# Patient Record
Sex: MALE | Race: WHITE | Employment: OTHER | URBAN - METROPOLITAN AREA
[De-identification: names, ages, dates, MRNs, and addresses within clinical notes are randomized per-mention and may not be internally consistent; named-entity substitution may affect disease eponyms.]

---

## 2023-08-24 RX ORDER — VERAPAMIL HYDROCHLORIDE 240 MG/1
240 TABLET, FILM COATED, EXTENDED RELEASE ORAL NIGHTLY
COMMUNITY

## 2023-09-26 ENCOUNTER — HOSPITAL ENCOUNTER (OUTPATIENT)
Facility: HOSPITAL | Age: 88
Setting detail: HOSPITAL OUTPATIENT SURGERY
Discharge: HOME | End: 2023-09-26
Attending: PLASTIC SURGERY | Admitting: PLASTIC SURGERY
Payer: MEDICARE

## 2023-09-26 VITALS
OXYGEN SATURATION: 99 % | BODY MASS INDEX: 26.2 KG/M2 | HEIGHT: 66 IN | TEMPERATURE: 98 F | WEIGHT: 163 LBS | SYSTOLIC BLOOD PRESSURE: 169 MMHG | HEART RATE: 86 BPM | RESPIRATION RATE: 16 BRPM | DIASTOLIC BLOOD PRESSURE: 97 MMHG

## 2023-09-26 PROCEDURE — 0HR0XK3 REPLACEMENT OF SCALP SKIN WITH NONAUTOLOGOUS TISSUE SUBSTITUTE, FULL THICKNESS, EXTERNAL APPROACH: ICD-10-PCS | Performed by: PLASTIC SURGERY

## 2023-09-26 PROCEDURE — 0HB0XZZ EXCISION OF SCALP SKIN, EXTERNAL APPROACH: ICD-10-PCS | Performed by: PLASTIC SURGERY

## 2023-09-26 DEVICE — INTEGRA® BILAYER MATRIX WOUND DRESSING 2 IN*2 IN (5 CM*5 CM)
Type: IMPLANTABLE DEVICE | Site: SCALP | Status: FUNCTIONAL
Brand: INTEGRA®

## 2023-09-26 RX ORDER — LIDOCAINE HYDROCHLORIDE AND EPINEPHRINE 10; 10 MG/ML; UG/ML
INJECTION, SOLUTION INFILTRATION; PERINEURAL AS NEEDED
Status: DISCONTINUED | OUTPATIENT
Start: 2023-09-26 | End: 2023-09-26 | Stop reason: HOSPADM

## 2023-09-26 RX ORDER — CEPHALEXIN 500 MG/1
500 CAPSULE ORAL 3 TIMES DAILY
Qty: 21 CAPSULE | Refills: 0 | Status: SHIPPED | OUTPATIENT
Start: 2023-09-26

## 2023-09-26 RX ORDER — SODIUM CHLORIDE, SODIUM LACTATE, POTASSIUM CHLORIDE, CALCIUM CHLORIDE 600; 310; 30; 20 MG/100ML; MG/100ML; MG/100ML; MG/100ML
INJECTION, SOLUTION INTRAVENOUS CONTINUOUS
Status: DISCONTINUED | OUTPATIENT
Start: 2023-09-26 | End: 2023-09-26

## 2023-09-26 RX ORDER — CEFAZOLIN SODIUM/WATER 2 G/20 ML
2 SYRINGE (ML) INTRAVENOUS ONCE
Status: COMPLETED | OUTPATIENT
Start: 2023-09-26 | End: 2023-09-26

## 2023-09-26 RX ORDER — MINERAL OIL
OIL (ML) MISCELLANEOUS AS NEEDED
Status: DISCONTINUED | OUTPATIENT
Start: 2023-09-26 | End: 2023-09-26 | Stop reason: HOSPADM

## 2023-09-26 RX ORDER — ACETAMINOPHEN 500 MG
1000 TABLET ORAL ONCE
Status: DISCONTINUED | OUTPATIENT
Start: 2023-09-26 | End: 2023-09-26 | Stop reason: HOSPADM

## 2023-09-26 NOTE — DISCHARGE INSTRUCTIONS
POST OPERATIVE INSTRUCTIONS    You may not get the operative site wet. Please take pain medicine as needed, tylenol and Ibuprofen. Please avoid trauma to the area. Please keep the surgical site protected. Please call if you develop signs of infection or fluid collection. If your surgical site is on your head, please keep your head elevated, sleep with a couple of pillows, and avoid strenuous activity. No heavy lifting. Please keep you dressing on until follow up.

## 2023-09-26 NOTE — BRIEF OP NOTE
Pre-Operative Diagnosis: MELANOMA OF SCALP     Post-Operative Diagnosis: MELANOMA OF SCALP      Procedure Performed:   INTEGRA PLACEMENT OF SCALP    Surgeon(s) and Role:     * Bernarda Leyden, MD - Primary    Assistant(s):  Surgical Assistant.: Gustabo Alfonso CSA          Estimated Blood Loss: Blood Output: 5 mL (9/26/2023  1:23 PM)          Tena Briseno MD  9/26/2023  1:27 PM

## 2023-10-05 RX ORDER — SODIUM CHLORIDE, SODIUM LACTATE, POTASSIUM CHLORIDE, CALCIUM CHLORIDE 600; 310; 30; 20 MG/100ML; MG/100ML; MG/100ML; MG/100ML
INJECTION, SOLUTION INTRAVENOUS CONTINUOUS
Status: CANCELLED | OUTPATIENT
Start: 2023-10-05

## 2023-10-05 RX ORDER — ACETAMINOPHEN 500 MG
1000 TABLET ORAL ONCE
Status: CANCELLED | OUTPATIENT
Start: 2023-10-05 | End: 2023-10-05

## 2023-10-05 RX ORDER — CEFAZOLIN SODIUM/WATER 2 G/20 ML
2 SYRINGE (ML) INTRAVENOUS ONCE
Status: CANCELLED | OUTPATIENT
Start: 2023-10-05 | End: 2023-10-05

## 2023-10-17 ENCOUNTER — HOSPITAL ENCOUNTER (OUTPATIENT)
Facility: HOSPITAL | Age: 88
Setting detail: HOSPITAL OUTPATIENT SURGERY
Discharge: HOME | End: 2023-10-17
Attending: PLASTIC SURGERY | Admitting: PLASTIC SURGERY
Payer: MEDICARE

## 2023-10-17 VITALS
TEMPERATURE: 98 F | HEIGHT: 66 IN | HEART RATE: 75 BPM | SYSTOLIC BLOOD PRESSURE: 145 MMHG | OXYGEN SATURATION: 99 % | WEIGHT: 164.19 LBS | DIASTOLIC BLOOD PRESSURE: 95 MMHG | RESPIRATION RATE: 16 BRPM | BODY MASS INDEX: 26.39 KG/M2

## 2023-10-17 RX ORDER — SODIUM CHLORIDE, SODIUM LACTATE, POTASSIUM CHLORIDE, CALCIUM CHLORIDE 600; 310; 30; 20 MG/100ML; MG/100ML; MG/100ML; MG/100ML
INJECTION, SOLUTION INTRAVENOUS CONTINUOUS
Status: DISCONTINUED | OUTPATIENT
Start: 2023-10-17 | End: 2023-10-17

## 2023-11-02 NOTE — OPERATIVE REPORT
[facility]    PATIENT'S NAME: Wilver Mcadams   ATTENDING PHYSICIAN: Aleksandar Youngblood MD   OPERATING PHYSICIAN: Aleksandar Youngblood MD   PATIENT ACCOUNT#:   [id number]    LOCATION:  [address] #:   [id number]       YOB: 1931  ADMISSION DATE:       09/26/2023      OPERATION DATE:  09/26/2023    OPERATIVE REPORT      PREOPERATIVE DIAGNOSIS:  Status post skin cancer resection of the scalp. POSTOPERATIVE DIAGNOSIS:  Status post skin cancer resection of the scalp. PROCEDURE:  Application of Integra in preparation of wound bed. ANESTHESIA:  Local.      INDICATIONS:  The patient is a very pleasant 80-year-old gentleman, well known to me for a history of undergoing skin cancer resection of the scalp. He presented for closure with Integra and split-thickness skin graft. Risks and benefits were discussed, and consent was obtained. OPERATIVE TECHNIQUE:  Patient was appropriately identified and taken to the operating room. He was positioned on the operating table in a supine fashion. Following this, a time-out was performed. He was prepped and draped in the usual sterile fashion. We then turned our attention to the wound and proceeded to inject 1% lidocaine with epinephrine for anesthesia. We then copiously irrigated with antibiotic solution and debrided the wound edges with scissors. This was a 3.5 cm wound. We then applied Integra in the usual sterile fashion and followed this with a bolster dressing of Xeroform, which was sewn on with silk sutures. Patient tolerated the procedure well, was taken to recovery room following completion of the procedure and application of dressing. He will follow up in the clinic in 1 week.      Dictated By Aleksandar Youngblood MD  d: 11/02/2023 08:43:00  t: 11/02/2023 16:19:36  Job [id number]  WH/

## (undated) DEVICE — MARKER SKIN PREP RESIST STRL

## (undated) DEVICE — ELECTRODE ES L2.75IN XLN STD BLDE MOD E-Z CLN

## (undated) DEVICE — DECANTER BAG 9": Brand: MEDLINE INDUSTRIES, INC.

## (undated) DEVICE — OCCLUSIVE GAUZE STRIP OVERWRAP,3% BISMUTH TRIBROMOPHENATE IN PETROLATUM BLEND: Brand: XEROFORM

## (undated) DEVICE — SOLUTION IRRIG 1000ML 0.9% NACL USP BTL

## (undated) DEVICE — SLEEVE COMPR M KNEE LEN SGL USE KENDALL SCD

## (undated) DEVICE — SHEET, DRAPE, SPLIT, STERILE: Brand: MEDLINE

## (undated) DEVICE — BANDAGE,GAUZE,BULKEE II,4.5"X4.1YD,STRL: Brand: MEDLINE

## (undated) DEVICE — SPONGE GZ 4XL4IN 100% COT 12 PLY TYP VII WVN

## (undated) DEVICE — SYRINGE BULB 50/CS 48/PLT: Brand: MEDEGEN MEDICAL PRODUCTS, LLC

## (undated) DEVICE — DRAPE FLD WRM W44XL66IN C6L FOR INTRATEMP SYS

## (undated) DEVICE — PLASTIC BREAST CDS-LF: Brand: MEDLINE INDUSTRIES, INC.

## (undated) DEVICE — SHEET,DRAPE,40X58,STERILE: Brand: MEDLINE

## (undated) DEVICE — SUTURE VCRL SZ 2-0 L27IN ABSRB UD L24MM FS-1

## (undated) DEVICE — SUTURE MCRYL SZ 4-0 L18IN ABSRB UD P-3 L13MM

## (undated) DEVICE — LIGHT HANDLE

## (undated) DEVICE — PROXIMATE RH ROTATING HEAD SKIN STAPLERS (35 WIDE) CONTAINS 35 STAINLESS STEEL STAPLES: Brand: PROXIMATE

## (undated) DEVICE — Device

## (undated) DEVICE — STERILE POLYISOPRENE POWDER-FREE SURGICAL GLOVES: Brand: PROTEXIS

## (undated) DEVICE — HEAD AND NECK CDS-LF: Brand: MEDLINE INDUSTRIES, INC.

## (undated) DEVICE — HOOK LOCK LATEX FREE ELASTIC BANDAGE 6INX5YD

## (undated) DEVICE — E-Z CLEAN, NON-STICK, PTFE COATED, ELECTROSURGICAL NEEDLE ELECTRODE, MODIFIED EXTENDED INSULATION, 2.75 INCH (7 CM): Brand: MEGADYNE

## (undated) DEVICE — SUTURE MCRYL SZ 4-0 L18IN ABSRB UD L19MM PS-2

## (undated) DEVICE — SUT SLK 3-0 18IN SH MULTPK BK

## (undated) DEVICE — SUTURE MCRYL SZ 3-0 L27IN ABSRB UD L19MM PS-2

## (undated) DEVICE — SUTURE CHROMIC GUT SZ 3-0 L27IN ABSRB UD

## (undated) DEVICE — PREMIUM WET SKIN PREP TRAY: Brand: MEDLINE INDUSTRIES, INC.

## (undated) DEVICE — CRADLE POS 3X5X24IN RASPBERRY ARM PRONE FOAM